# Patient Record
Sex: FEMALE | Race: BLACK OR AFRICAN AMERICAN | NOT HISPANIC OR LATINO
[De-identification: names, ages, dates, MRNs, and addresses within clinical notes are randomized per-mention and may not be internally consistent; named-entity substitution may affect disease eponyms.]

---

## 2021-03-15 ENCOUNTER — APPOINTMENT (OUTPATIENT)
Dept: INTERNAL MEDICINE | Facility: CLINIC | Age: 21
End: 2021-03-15
Payer: MEDICAID

## 2021-03-15 ENCOUNTER — OUTPATIENT (OUTPATIENT)
Dept: OUTPATIENT SERVICES | Facility: HOSPITAL | Age: 21
LOS: 1 days | End: 2021-03-15

## 2021-03-15 VITALS
SYSTOLIC BLOOD PRESSURE: 124 MMHG | RESPIRATION RATE: 16 BRPM | DIASTOLIC BLOOD PRESSURE: 76 MMHG | HEART RATE: 102 BPM | HEIGHT: 65 IN | OXYGEN SATURATION: 99 % | WEIGHT: 183 LBS | BODY MASS INDEX: 30.49 KG/M2

## 2021-03-15 VITALS — TEMPERATURE: 97.8 F

## 2021-03-15 DIAGNOSIS — Z83.3 FAMILY HISTORY OF DIABETES MELLITUS: ICD-10-CM

## 2021-03-15 DIAGNOSIS — N64.4 MASTODYNIA: ICD-10-CM

## 2021-03-15 DIAGNOSIS — Z87.42 PERSONAL HISTORY OF OTHER DISEASES OF THE FEMALE GENITAL TRACT: ICD-10-CM

## 2021-03-15 DIAGNOSIS — Z00.00 ENCOUNTER FOR GENERAL ADULT MEDICAL EXAMINATION W/OUT ABNORMAL FINDINGS: ICD-10-CM

## 2021-03-15 PROCEDURE — 99385 PREV VISIT NEW AGE 18-39: CPT | Mod: GC

## 2021-03-19 DIAGNOSIS — Z87.42 PERSONAL HISTORY OF OTHER DISEASES OF THE FEMALE GENITAL TRACT: ICD-10-CM

## 2021-03-19 DIAGNOSIS — Z00.00 ENCOUNTER FOR GENERAL ADULT MEDICAL EXAMINATION WITHOUT ABNORMAL FINDINGS: ICD-10-CM

## 2021-03-19 DIAGNOSIS — Z86.2 PERSONAL HISTORY OF DISEASES OF THE BLOOD AND BLOOD-FORMING ORGANS AND CERTAIN DISORDERS INVOLVING THE IMMUNE MECHANISM: ICD-10-CM

## 2021-03-19 DIAGNOSIS — Z83.3 FAMILY HISTORY OF DIABETES MELLITUS: ICD-10-CM

## 2021-03-19 DIAGNOSIS — N64.4 MASTODYNIA: ICD-10-CM

## 2021-03-19 NOTE — PHYSICAL EXAM
[Axillary Lymph Nodes Enlarged Bilaterally] : enlarged nodes bilaterally [Soft] : abdomen soft [Non Tender] : non-tender [Normal Bowel Sounds] : normal bowel sounds [Normal] : affect was normal and insight and judgment were intact [de-identified] : Right areola a little dry [de-identified] : Distended

## 2021-03-19 NOTE — PLAN
[FreeTextEntry1] : 20 year old female with history of irregular bleeding (on birth control pills) presenting with bilateral nipple soreness and to establish care. The nipple soreness started a few weeks ago, likely 2/2 new bras with less support and more abrasion vs medication induced.\par \par # Bilateral breast soreness\par - Started a few weeks ago. Two new things: purchased new bras and started birth control 4 months ago. Denies nipple discharge or bleeding. Breasts are large and nipples face the ground when patient is upright. \par - Exam performed chaperoned by Dr. Gutierrez after obtaining consent from patient to perform a breast exam. \par - Recommended discussing birth control side effects with gynecologist, and possibly switching to another brand or formulation\par - Recommended changing bras back to the original brand and/or ones that are more supportive\par - Recommended using emollients like A+D ointment, gel pads between the nipple and bra, and using warm or cold compresses\par \par # Irregular bleeding\par - First period at age 11, and irregular until last March when bled daily\par - Denies sexual activity\par - Started birth control after seeing gynecologist 4 months ago\par \par # HCM\par - Recommended pap smear with gynecologist for age-appropriate screening\par - Asked pt to send us immunization history\par - f/u CMP, CBC, A1C, Hcg serum and TSH\par \par RTC in 1 year\par \par Liz Hernandez MD\par PGY-1\par Patient seen with Dr. Gutierrez

## 2021-03-19 NOTE — HISTORY OF PRESENT ILLNESS
[FreeTextEntry1] : nipple soreness bilaterally and establishing care [de-identified] : 20 year old female with history of irregular bleeding (on birth control pills) presenting with bilateral nipple soreness and to establish care. The nipple soreness started a few weeks ago. She denies being sexually active. She recently changed her bra and started taking birth control 4 months ago, prescribed by a gynecologist after irregular bleeding. Patient had her first menstrual period at age 11. She has been irregular, and then last March started bleeding every day. She became anemic requiring iron transfusions. Since starting the birth control pills, patient's period are now regular, occurring every 30 days. The gynecologist performed a sonogram and saw no structural irregularities.

## 2021-03-19 NOTE — PAST MEDICAL HISTORY
[Menstruating] : menstruating [unknown] : the patient is unsure of the date of her LMP [Regular Cycle Intervals] : have been regular [Dysmenorrhea] : dysmenorrhea [FreeTextEntry1] : Hx of bleeding daily, now on birth control has regular periods every 30 days

## 2021-03-19 NOTE — END OF VISIT
[] : Resident [FreeTextEntry3] : conservative mangement of nipple soreness, possibly realted to BC, rec pads, consider changing BC

## 2021-08-09 ENCOUNTER — OUTPATIENT (OUTPATIENT)
Dept: OUTPATIENT SERVICES | Facility: HOSPITAL | Age: 21
LOS: 1 days | End: 2021-08-09

## 2021-08-09 ENCOUNTER — APPOINTMENT (OUTPATIENT)
Dept: INTERNAL MEDICINE | Facility: CLINIC | Age: 21
End: 2021-08-09
Payer: MEDICAID

## 2021-08-09 VITALS
HEART RATE: 62 BPM | BODY MASS INDEX: 29.99 KG/M2 | DIASTOLIC BLOOD PRESSURE: 82 MMHG | HEIGHT: 65 IN | OXYGEN SATURATION: 100 % | RESPIRATION RATE: 16 BRPM | SYSTOLIC BLOOD PRESSURE: 125 MMHG | WEIGHT: 180 LBS

## 2021-08-09 DIAGNOSIS — Z86.2 PERSONAL HISTORY OF DISEASES OF THE BLOOD AND BLOOD-FORMING ORGANS AND CERTAIN DISORDERS INVOLVING THE IMMUNE MECHANISM: ICD-10-CM

## 2021-08-09 DIAGNOSIS — R42 DIZZINESS AND GIDDINESS: ICD-10-CM

## 2021-08-09 PROCEDURE — 99213 OFFICE O/P EST LOW 20 MIN: CPT

## 2021-08-10 ENCOUNTER — APPOINTMENT (OUTPATIENT)
Dept: INTERNAL MEDICINE | Facility: CLINIC | Age: 21
End: 2021-08-10

## 2021-08-10 ENCOUNTER — RESULT REVIEW (OUTPATIENT)
Age: 21
End: 2021-08-10

## 2021-08-10 VITALS — TEMPERATURE: 97.7 F

## 2021-08-10 DIAGNOSIS — Z86.2 PERSONAL HISTORY OF DISEASES OF THE BLOOD AND BLOOD-FORMING ORGANS AND CERTAIN DISORDERS INVOLVING THE IMMUNE MECHANISM: ICD-10-CM

## 2021-08-10 DIAGNOSIS — R42 DIZZINESS AND GIDDINESS: ICD-10-CM

## 2021-08-10 DIAGNOSIS — N92.0 EXCESSIVE AND FREQUENT MENSTRUATION WITH REGULAR CYCLE: ICD-10-CM

## 2021-08-10 LAB
ALBUMIN SERPL ELPH-MCNC: 4.7 G/DL — SIGNIFICANT CHANGE UP (ref 3.3–5)
ALP SERPL-CCNC: 63 U/L — SIGNIFICANT CHANGE UP (ref 40–120)
ALT FLD-CCNC: 23 U/L — SIGNIFICANT CHANGE UP (ref 4–33)
ANION GAP SERPL CALC-SCNC: 13 MMOL/L — SIGNIFICANT CHANGE UP (ref 7–14)
AST SERPL-CCNC: 16 U/L — SIGNIFICANT CHANGE UP (ref 4–32)
BASOPHILS # BLD AUTO: 0.06 K/UL — SIGNIFICANT CHANGE UP (ref 0–0.2)
BASOPHILS NFR BLD AUTO: 0.8 % — SIGNIFICANT CHANGE UP (ref 0–2)
BILIRUB SERPL-MCNC: 0.3 MG/DL — SIGNIFICANT CHANGE UP (ref 0.2–1.2)
BUN SERPL-MCNC: 11 MG/DL — SIGNIFICANT CHANGE UP (ref 7–23)
CALCIUM SERPL-MCNC: 9.4 MG/DL — SIGNIFICANT CHANGE UP (ref 8.4–10.5)
CHLORIDE SERPL-SCNC: 104 MMOL/L — SIGNIFICANT CHANGE UP (ref 98–107)
CO2 SERPL-SCNC: 21 MMOL/L — LOW (ref 22–31)
CREAT SERPL-MCNC: 0.6 MG/DL — SIGNIFICANT CHANGE UP (ref 0.5–1.3)
EOSINOPHIL # BLD AUTO: 0.09 K/UL — SIGNIFICANT CHANGE UP (ref 0–0.5)
EOSINOPHIL NFR BLD AUTO: 1.1 % — SIGNIFICANT CHANGE UP (ref 0–6)
GLUCOSE SERPL-MCNC: 96 MG/DL — SIGNIFICANT CHANGE UP (ref 70–99)
HCT VFR BLD CALC: 30.1 % — LOW (ref 34.5–45)
HGB BLD-MCNC: 8.5 G/DL — LOW (ref 11.5–15.5)
IANC: 3.98 K/UL — SIGNIFICANT CHANGE UP (ref 1.5–8.5)
IMM GRANULOCYTES NFR BLD AUTO: 0.3 % — SIGNIFICANT CHANGE UP (ref 0–1.5)
IRON SATN MFR SERPL: 30 UG/DL — SIGNIFICANT CHANGE UP (ref 30–160)
LYMPHOCYTES # BLD AUTO: 3.34 K/UL — HIGH (ref 1–3.3)
LYMPHOCYTES # BLD AUTO: 41.9 % — SIGNIFICANT CHANGE UP (ref 13–44)
MCHC RBC-ENTMCNC: 20.4 PG — LOW (ref 27–34)
MCHC RBC-ENTMCNC: 28.2 GM/DL — LOW (ref 32–36)
MCV RBC AUTO: 72.4 FL — LOW (ref 80–100)
MONOCYTES # BLD AUTO: 0.48 K/UL — SIGNIFICANT CHANGE UP (ref 0–0.9)
MONOCYTES NFR BLD AUTO: 6 % — SIGNIFICANT CHANGE UP (ref 2–14)
NEUTROPHILS # BLD AUTO: 3.98 K/UL — SIGNIFICANT CHANGE UP (ref 1.8–7.4)
NEUTROPHILS NFR BLD AUTO: 49.9 % — SIGNIFICANT CHANGE UP (ref 43–77)
NRBC # BLD: 0 /100 WBCS — SIGNIFICANT CHANGE UP
NRBC # FLD: 0 K/UL — SIGNIFICANT CHANGE UP
PLATELET # BLD AUTO: 462 K/UL — HIGH (ref 150–400)
POTASSIUM SERPL-MCNC: 4.2 MMOL/L — SIGNIFICANT CHANGE UP (ref 3.5–5.3)
POTASSIUM SERPL-SCNC: 4.2 MMOL/L — SIGNIFICANT CHANGE UP (ref 3.5–5.3)
PROT SERPL-MCNC: 7.5 G/DL — SIGNIFICANT CHANGE UP (ref 6–8.3)
RBC # BLD: 4.16 M/UL — SIGNIFICANT CHANGE UP (ref 3.8–5.2)
RBC # FLD: 17.2 % — HIGH (ref 10.3–14.5)
SODIUM SERPL-SCNC: 138 MMOL/L — SIGNIFICANT CHANGE UP (ref 135–145)
WBC # BLD: 7.97 K/UL — SIGNIFICANT CHANGE UP (ref 3.8–10.5)
WBC # FLD AUTO: 7.97 K/UL — SIGNIFICANT CHANGE UP (ref 3.8–10.5)

## 2021-09-10 ENCOUNTER — APPOINTMENT (OUTPATIENT)
Dept: INTERNAL MEDICINE | Facility: CLINIC | Age: 21
End: 2021-09-10
Payer: MEDICAID

## 2021-09-10 ENCOUNTER — OUTPATIENT (OUTPATIENT)
Dept: OUTPATIENT SERVICES | Facility: HOSPITAL | Age: 21
LOS: 1 days | End: 2021-09-10

## 2021-09-10 ENCOUNTER — RESULT REVIEW (OUTPATIENT)
Age: 21
End: 2021-09-10

## 2021-09-10 ENCOUNTER — NON-APPOINTMENT (OUTPATIENT)
Age: 21
End: 2021-09-10

## 2021-09-10 VITALS
HEART RATE: 65 BPM | DIASTOLIC BLOOD PRESSURE: 80 MMHG | SYSTOLIC BLOOD PRESSURE: 123 MMHG | OXYGEN SATURATION: 99 % | BODY MASS INDEX: 30.32 KG/M2 | WEIGHT: 182 LBS | HEIGHT: 65 IN

## 2021-09-10 VITALS — TEMPERATURE: 97.5 F

## 2021-09-10 PROCEDURE — 99214 OFFICE O/P EST MOD 30 MIN: CPT | Mod: GC

## 2021-09-14 NOTE — HISTORY OF PRESENT ILLNESS
[FreeTextEntry1] : Anemia [de-identified] : Pt is a 22 y/o female with pmh of menorrhagia and DON presenting for f/u of fatigue. Pt says her fatigue has improved since last visit. She still feels some fatigue with exertion, but the fatigue is minimal. She says that she has been compliant with iron supplements and OCPs and thinks the medications have been helpful in improving her fatigue.

## 2021-09-15 DIAGNOSIS — Z86.2 PERSONAL HISTORY OF DISEASES OF THE BLOOD AND BLOOD-FORMING ORGANS AND CERTAIN DISORDERS INVOLVING THE IMMUNE MECHANISM: ICD-10-CM

## 2021-09-15 DIAGNOSIS — N92.0 EXCESSIVE AND FREQUENT MENSTRUATION WITH REGULAR CYCLE: ICD-10-CM

## 2021-09-15 DIAGNOSIS — D64.9 ANEMIA, UNSPECIFIED: ICD-10-CM

## 2022-05-10 ENCOUNTER — OUTPATIENT (OUTPATIENT)
Dept: OUTPATIENT SERVICES | Facility: HOSPITAL | Age: 22
LOS: 1 days | End: 2022-05-10

## 2022-05-10 ENCOUNTER — APPOINTMENT (OUTPATIENT)
Dept: INTERNAL MEDICINE | Facility: CLINIC | Age: 22
End: 2022-05-10
Payer: MEDICAID

## 2022-05-10 VITALS
WEIGHT: 178 LBS | TEMPERATURE: 97.8 F | HEIGHT: 65 IN | SYSTOLIC BLOOD PRESSURE: 126 MMHG | RESPIRATION RATE: 16 BRPM | HEART RATE: 110 BPM | BODY MASS INDEX: 29.66 KG/M2 | OXYGEN SATURATION: 98 % | DIASTOLIC BLOOD PRESSURE: 60 MMHG

## 2022-05-10 DIAGNOSIS — Z23 ENCOUNTER FOR IMMUNIZATION: ICD-10-CM

## 2022-05-10 DIAGNOSIS — D64.9 ANEMIA, UNSPECIFIED: ICD-10-CM

## 2022-05-10 PROCEDURE — 99213 OFFICE O/P EST LOW 20 MIN: CPT | Mod: GE

## 2022-05-10 RX ORDER — CHLORHEXIDINE GLUCONATE 4 %
325 (65 FE) LIQUID (ML) TOPICAL 3 TIMES DAILY
Qty: 90 | Refills: 3 | Status: ACTIVE | COMMUNITY
Start: 2021-08-09 | End: 1900-01-01

## 2022-05-11 LAB
BASOPHILS # BLD AUTO: 0.08 K/UL
BASOPHILS NFR BLD AUTO: 0.9 %
EOSINOPHIL # BLD AUTO: 0.13 K/UL
EOSINOPHIL NFR BLD AUTO: 1.5 %
HCT VFR BLD CALC: 38.7 %
HGB BLD-MCNC: 11.6 G/DL
IMM GRANULOCYTES NFR BLD AUTO: 0.2 %
LYMPHOCYTES # BLD AUTO: 3.29 K/UL
LYMPHOCYTES NFR BLD AUTO: 36.8 %
MAN DIFF?: NORMAL
MCHC RBC-ENTMCNC: 21.6 PG
MCHC RBC-ENTMCNC: 30 GM/DL
MCV RBC AUTO: 71.9 FL
MONOCYTES # BLD AUTO: 0.56 K/UL
MONOCYTES NFR BLD AUTO: 6.3 %
NEUTROPHILS # BLD AUTO: 4.87 K/UL
NEUTROPHILS NFR BLD AUTO: 54.3 %
PLATELET # BLD AUTO: 347 K/UL
RBC # BLD: 5.38 M/UL
RBC # BLD: 5.38 M/UL
RBC # FLD: 17.2 %
RETICS # AUTO: 1.2 %
RETICS AGGREG/RBC NFR: 62 K/UL
WBC # FLD AUTO: 8.95 K/UL

## 2022-05-13 DIAGNOSIS — N92.0 EXCESSIVE AND FREQUENT MENSTRUATION WITH REGULAR CYCLE: ICD-10-CM

## 2022-05-13 DIAGNOSIS — D64.9 ANEMIA, UNSPECIFIED: ICD-10-CM

## 2022-05-13 NOTE — HISTORY OF PRESENT ILLNESS
[FreeTextEntry1] : Menorrhagia [de-identified] : 21 year old female with history of irregular bleeding (on birth control pills) presenting with follow up and brit control refill. \par \par She no longer has bilateral nipple soreness which resolved with new bras and emollients. Now that she is on birth control, he  Last March started bleeding every day. She became anemic requiring iron transfusions. Since starting the birth control pills, patient's period are now regular, occurring every 30 days, uses 2 pads per day (rather than 4 pads per day prior) and periods last 1 week rather than multiple weeks. The gynecologist performed a sonogram and saw no structural irregularities. Also, fatigue and weakness have also resolved.

## 2022-05-13 NOTE — ASSESSMENT
[FreeTextEntry1] : # HCM\par - Recommended pap smear with gynecologist for age-appropriate screening\par - Tdap (5/10/22)\par - HPV (5/10/22), 2nd dose ordered for 1 month and 3rd for 6 months\par - COVID Moderna vaccine 5/18/21, 6/15/21, and 3/15/22\par - Discussed HPV and Pap screening and patient would prefer to wait until she is 24 yrs old. She is not sexually acitve\par \par \par RTC in 6 months for 3rd HPV vaccine and flu shot\par RTC for nursing visit and 2nd HPV in 1 month\par \par \par Liz Hernandez MD PGY2\par Internal Medicine \par [facility]\par [phone]\par [facility]\par \par Patient seen with Dr. Granda

## 2022-06-07 ENCOUNTER — APPOINTMENT (OUTPATIENT)
Dept: INTERNAL MEDICINE | Facility: CLINIC | Age: 22
End: 2022-06-07

## 2023-05-03 ENCOUNTER — RX RENEWAL (OUTPATIENT)
Age: 23
End: 2023-05-03

## 2023-05-03 RX ORDER — NORGESTIMATE AND ETHINYL ESTRADIOL 0.25-0.035
0.25-35 KIT ORAL DAILY
Qty: 84 | Refills: 2 | Status: ACTIVE | COMMUNITY
Start: 2023-05-03 | End: 1900-01-01

## 2023-08-09 ENCOUNTER — OUTPATIENT (OUTPATIENT)
Dept: OUTPATIENT SERVICES | Facility: HOSPITAL | Age: 23
LOS: 1 days | End: 2023-08-09

## 2023-08-09 ENCOUNTER — APPOINTMENT (OUTPATIENT)
Dept: INTERNAL MEDICINE | Facility: CLINIC | Age: 23
End: 2023-08-09
Payer: MEDICAID

## 2023-08-09 VITALS
DIASTOLIC BLOOD PRESSURE: 58 MMHG | WEIGHT: 195 LBS | TEMPERATURE: 97.3 F | RESPIRATION RATE: 16 BRPM | BODY MASS INDEX: 32.49 KG/M2 | HEIGHT: 65 IN | SYSTOLIC BLOOD PRESSURE: 102 MMHG | OXYGEN SATURATION: 99 % | HEART RATE: 96 BPM

## 2023-08-09 DIAGNOSIS — N92.0 EXCESSIVE AND FREQUENT MENSTRUATION WITH REGULAR CYCLE: ICD-10-CM

## 2023-08-09 PROCEDURE — 99214 OFFICE O/P EST MOD 30 MIN: CPT | Mod: GC

## 2023-08-09 RX ORDER — NORGESTIMATE AND ETHINYL ESTRADIOL 0.25-0.035
0.25-35 KIT ORAL DAILY
Qty: 3 | Refills: 3 | Status: ACTIVE | COMMUNITY
Start: 2021-08-09 | End: 1900-01-01

## 2023-08-09 NOTE — COUNSELING
[Potential consequences of obesity discussed] : Potential consequences of obesity discussed [Benefits of weight loss discussed] : Benefits of weight loss discussed [Encouraged to increase physical activity] : Encouraged to increase physical activity [FreeTextEntry2] : Eats a variety of foods, family cooks meals so it would be hard to individualize your diet for now

## 2023-08-09 NOTE — PLAN
[FreeTextEntry1] : 23-year-old female with history of irregular bleeding (on birth control pills) presenting with follow up and birth control refill  #Menorrhagia  Reports periods are much more regular since being on birth control. Only uses 2-3 pads per day, so seems that bleeding is much more under control. No concerns with her periods at this time.  -f/u CBC to assess for any anemia still -c/w birth control  HCM  - Recommended pap smear with women's health in clinic. Discussed benefits regarding screening and she is agreeable to pap smear. - Tdap (5/10/22) - HPV completed in childhood, no hx of it in chart - COVID Moderna vaccine 5/18/21, 6/15/21, and 3/15/22 - discussed flu vaccine and covid booster this fall. Pt will go to [facility] to obtain - Not sexually active, no need for STI testing at this time   RTC in 1 year for annual visit CPE.  Case discussed with Dr. Nina.  Edmundo Samson PGY1 Internal Medicine  [facility]

## 2023-08-09 NOTE — HISTORY OF PRESENT ILLNESS
[de-identified] : 23 year old female with history of irregular bleeding (on birth control pills) presenting with follow up and birth control refill. Previous Dr. Hernandez patient.  No active concerns today. No changes in health since last visit. Reports doing well overall.  1) Menorrhagia: Mentions periods are regular ever since starting birth control. Occur every month, roughly 5 days. Mentions some cramps during the beginning of it, but occasional. Uses 2-3 pads per day.    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 In her summer break, starts school in 2 weeks. Reports school is going well and doing well in her classes. No active concerns. Excited to get back to school. Interested in forensic biology as a future career. Enjoys listening to music and writing short stories on her free time. Lives at home with family. No active concerns or safety issues at home.  Currently not sexually active or in a relationship No alcohol use or drug use or herbal supplements

## 2023-08-21 DIAGNOSIS — N92.0 EXCESSIVE AND FREQUENT MENSTRUATION WITH REGULAR CYCLE: ICD-10-CM

## 2024-08-27 ENCOUNTER — APPOINTMENT (OUTPATIENT)
Dept: INTERNAL MEDICINE | Facility: CLINIC | Age: 24
End: 2024-08-27

## 2024-12-09 ENCOUNTER — APPOINTMENT (OUTPATIENT)
Dept: INTERNAL MEDICINE | Facility: CLINIC | Age: 24
End: 2024-12-09
Payer: MEDICAID

## 2024-12-09 ENCOUNTER — OUTPATIENT (OUTPATIENT)
Dept: OUTPATIENT SERVICES | Facility: HOSPITAL | Age: 24
LOS: 1 days | End: 2024-12-09

## 2024-12-09 VITALS
HEIGHT: 65 IN | DIASTOLIC BLOOD PRESSURE: 72 MMHG | OXYGEN SATURATION: 97 % | BODY MASS INDEX: 31.32 KG/M2 | HEART RATE: 102 BPM | WEIGHT: 188 LBS | SYSTOLIC BLOOD PRESSURE: 104 MMHG

## 2024-12-09 DIAGNOSIS — D64.9 ANEMIA, UNSPECIFIED: ICD-10-CM

## 2024-12-09 DIAGNOSIS — E66.811 OBESITY, CLASS 1: ICD-10-CM

## 2024-12-09 DIAGNOSIS — N92.0 EXCESSIVE AND FREQUENT MENSTRUATION WITH REGULAR CYCLE: ICD-10-CM

## 2024-12-09 PROCEDURE — 99213 OFFICE O/P EST LOW 20 MIN: CPT

## 2024-12-10 LAB
CHOLEST SERPL-MCNC: 221 MG/DL
ESTIMATED AVERAGE GLUCOSE: 100 MG/DL
HBA1C MFR BLD HPLC: 5.1 %
HCT VFR BLD CALC: 36.2 %
HDLC SERPL-MCNC: 51 MG/DL
HGB BLD-MCNC: 10.6 G/DL
LDLC SERPL CALC-MCNC: 158 MG/DL
MCHC RBC-ENTMCNC: 20.7 PG
MCHC RBC-ENTMCNC: 29.3 G/DL
MCV RBC AUTO: 70.7 FL
NONHDLC SERPL-MCNC: 170 MG/DL
PLATELET # BLD AUTO: 526 K/UL
RBC # BLD: 5.12 M/UL
RBC # FLD: 15.8 %
TRIGL SERPL-MCNC: 65 MG/DL
WBC # FLD AUTO: 7.62 K/UL

## 2024-12-19 DIAGNOSIS — D64.9 ANEMIA, UNSPECIFIED: ICD-10-CM

## 2024-12-19 DIAGNOSIS — N92.0 EXCESSIVE AND FREQUENT MENSTRUATION WITH REGULAR CYCLE: ICD-10-CM

## 2024-12-19 DIAGNOSIS — E66.811 OBESITY, CLASS 1: ICD-10-CM

## 2025-09-05 ENCOUNTER — RX RENEWAL (OUTPATIENT)
Age: 25
End: 2025-09-05